# Patient Record
Sex: FEMALE | Race: OTHER | HISPANIC OR LATINO | URBAN - METROPOLITAN AREA
[De-identification: names, ages, dates, MRNs, and addresses within clinical notes are randomized per-mention and may not be internally consistent; named-entity substitution may affect disease eponyms.]

---

## 2017-10-06 NOTE — PATIENT DISCUSSION
Patient informed [employer] has a yellow tint and she may be noticing difference between IOL in OD and OS.

## 2018-12-19 ENCOUNTER — IMPORTED ENCOUNTER (OUTPATIENT)
Dept: URBAN - METROPOLITAN AREA CLINIC 50 | Facility: CLINIC | Age: 53
End: 2018-12-19

## 2018-12-20 ENCOUNTER — IMPORTED ENCOUNTER (OUTPATIENT)
Dept: URBAN - METROPOLITAN AREA CLINIC 50 | Facility: CLINIC | Age: 53
End: 2018-12-20

## 2019-01-30 ENCOUNTER — IMPORTED ENCOUNTER (OUTPATIENT)
Dept: URBAN - METROPOLITAN AREA CLINIC 50 | Facility: CLINIC | Age: 54
End: 2019-01-30

## 2019-10-08 NOTE — PATIENT DISCUSSION
Attempted to insert quintess dissolvable 0.4 mm plug today but plug would not insert into punctum.  Possible buried plug?

## 2019-10-30 NOTE — PROCEDURE NOTE: SURGICAL
<p>Prior to commencing surgery patient identification, surgical procedure, site, and side were confirmed by Dr. Mer Acosta. Following topical proparacaine anesthesia, the patient was positioned at the YAG laser, a contact lens coupled to the cornea with methylcellulose and an axial posterior capsulotomy performed without complication using 3.5 Mj x 45. Excess methylcellulose was washed from the eye, one drop of Alphagan was instilled and the patient returned to the holding area having tolerated the procedure well and without complication. </p><p>MRN:[id number]</p>

## 2019-12-09 NOTE — PATIENT DISCUSSION
PATIENT LEFT WITHOUT BEING SEEN BY [facility].  [facility] WENT TO GET PATIENT AT 11:45 AM AND PATIENT WAS GONE.

## 2020-05-21 NOTE — PATIENT DISCUSSION
removed PP LLL.   no complications.  RTC for new plug when patient returns from traveling. Statement Selected

## 2020-05-21 NOTE — PATIENT DISCUSSION
PATIENT LEFT WITHOUT BEING SEEN BY [facility].  [facility] WENT TO GET PATIENT AT 11:45 AM AND PATIENT WAS GONE.

## 2020-06-24 NOTE — PATIENT DISCUSSION
PATIENT LEFT WITHOUT BEING SEEN BY [facility].  [facility] WENT TO GET PATIENT AT 11:45 AM AND PATIENT WAS GONE.

## 2021-03-18 ENCOUNTER — IMPORTED ENCOUNTER (OUTPATIENT)
Dept: URBAN - METROPOLITAN AREA CLINIC 50 | Facility: CLINIC | Age: 56
End: 2021-03-18

## 2021-04-17 ASSESSMENT — TONOMETRY
OS_IOP_MMHG: 15
OD_IOP_MMHG: 13
OD_IOP_MMHG: 15
OS_IOP_MMHG: 13
OD_IOP_MMHG: 14
OS_IOP_MMHG: 14

## 2021-04-17 ASSESSMENT — VISUAL ACUITY
OS_CC: J1+
OD_SC: 20/25
OS_BAT: 20/50
OD_CC: 20/30-2
OS_CC: J1+@ 16 IN
OD_OTHER: 20/50. 20/70.
OD_SC: 20/20-2
OD_BAT: 20/50
OD_CC: J1+
OS_SC: 20/25-1
OS_CC: 20/20
OS_SC: 20/20-2
OS_OTHER: 20/50. 20/60.
OD_CC: J1+@ 16 IN
